# Patient Record
Sex: FEMALE | Race: WHITE | NOT HISPANIC OR LATINO | URBAN - METROPOLITAN AREA
[De-identification: names, ages, dates, MRNs, and addresses within clinical notes are randomized per-mention and may not be internally consistent; named-entity substitution may affect disease eponyms.]

---

## 2017-04-08 ENCOUNTER — INPATIENT (INPATIENT)
Facility: HOSPITAL | Age: 37
LOS: 24 days | Discharge: HOME | End: 2017-05-03
Attending: PSYCHIATRY & NEUROLOGY | Admitting: PSYCHIATRY & NEUROLOGY
Payer: MEDICAID

## 2017-04-08 VITALS
SYSTOLIC BLOOD PRESSURE: 158 MMHG | TEMPERATURE: 98 F | OXYGEN SATURATION: 100 % | RESPIRATION RATE: 16 BRPM | DIASTOLIC BLOOD PRESSURE: 90 MMHG | HEART RATE: 96 BPM

## 2017-04-08 DIAGNOSIS — F31.9 BIPOLAR DISORDER, UNSPECIFIED: ICD-10-CM

## 2017-04-08 DIAGNOSIS — F25.0 SCHIZOAFFECTIVE DISORDER, BIPOLAR TYPE: ICD-10-CM

## 2017-04-08 LAB
ALBUMIN SERPL ELPH-MCNC: 4.5 G/DL — SIGNIFICANT CHANGE UP (ref 3.3–5)
ALP SERPL-CCNC: 56 U/L — SIGNIFICANT CHANGE UP (ref 40–120)
ALT FLD-CCNC: 20 U/L — SIGNIFICANT CHANGE UP (ref 4–33)
AMPHET UR-MCNC: POSITIVE — SIGNIFICANT CHANGE UP
APAP SERPL-MCNC: < 15 UG/ML — LOW (ref 15–25)
APPEARANCE UR: SIGNIFICANT CHANGE UP
AST SERPL-CCNC: 20 U/L — SIGNIFICANT CHANGE UP (ref 4–32)
BACTERIA # UR AUTO: SIGNIFICANT CHANGE UP
BARBITURATES MEASUREMENT: NEGATIVE — SIGNIFICANT CHANGE UP
BARBITURATES UR SCN-MCNC: NEGATIVE — SIGNIFICANT CHANGE UP
BASOPHILS # BLD AUTO: 0.01 K/UL — SIGNIFICANT CHANGE UP (ref 0–0.2)
BASOPHILS NFR BLD AUTO: 0.1 % — SIGNIFICANT CHANGE UP (ref 0–2)
BENZODIAZ SERPL-MCNC: NEGATIVE — SIGNIFICANT CHANGE UP
BENZODIAZ UR-MCNC: NEGATIVE — SIGNIFICANT CHANGE UP
BILIRUB SERPL-MCNC: 0.3 MG/DL — SIGNIFICANT CHANGE UP (ref 0.2–1.2)
BILIRUB UR-MCNC: NEGATIVE — SIGNIFICANT CHANGE UP
BLOOD UR QL VISUAL: HIGH
BUN SERPL-MCNC: 14 MG/DL — SIGNIFICANT CHANGE UP (ref 7–23)
CALCIUM SERPL-MCNC: 9.3 MG/DL — SIGNIFICANT CHANGE UP (ref 8.4–10.5)
CANNABINOIDS UR-MCNC: NEGATIVE — SIGNIFICANT CHANGE UP
CHLORIDE SERPL-SCNC: 106 MMOL/L — SIGNIFICANT CHANGE UP (ref 98–107)
CO2 SERPL-SCNC: 21 MMOL/L — LOW (ref 22–31)
COCAINE METAB.OTHER UR-MCNC: NEGATIVE — SIGNIFICANT CHANGE UP
COLOR SPEC: YELLOW — SIGNIFICANT CHANGE UP
CREAT SERPL-MCNC: 0.8 MG/DL — SIGNIFICANT CHANGE UP (ref 0.5–1.3)
EOSINOPHIL # BLD AUTO: 0.07 K/UL — SIGNIFICANT CHANGE UP (ref 0–0.5)
EOSINOPHIL NFR BLD AUTO: 0.9 % — SIGNIFICANT CHANGE UP (ref 0–6)
ETHANOL BLD-MCNC: < 10 MG/DL — SIGNIFICANT CHANGE UP
GLUCOSE SERPL-MCNC: 82 MG/DL — SIGNIFICANT CHANGE UP (ref 70–99)
GLUCOSE UR-MCNC: NEGATIVE — SIGNIFICANT CHANGE UP
HCG UR-SCNC: NEGATIVE — SIGNIFICANT CHANGE UP
HCT VFR BLD CALC: 38.3 % — SIGNIFICANT CHANGE UP (ref 34.5–45)
HGB BLD-MCNC: 12.1 G/DL — SIGNIFICANT CHANGE UP (ref 11.5–15.5)
IMM GRANULOCYTES NFR BLD AUTO: 0.1 % — SIGNIFICANT CHANGE UP (ref 0–1.5)
KETONES UR-MCNC: NEGATIVE — SIGNIFICANT CHANGE UP
LEUKOCYTE ESTERASE UR-ACNC: HIGH
LYMPHOCYTES # BLD AUTO: 1.41 K/UL — SIGNIFICANT CHANGE UP (ref 1–3.3)
LYMPHOCYTES # BLD AUTO: 18.6 % — SIGNIFICANT CHANGE UP (ref 13–44)
MCHC RBC-ENTMCNC: 25.4 PG — LOW (ref 27–34)
MCHC RBC-ENTMCNC: 31.6 % — LOW (ref 32–36)
MCV RBC AUTO: 80.3 FL — SIGNIFICANT CHANGE UP (ref 80–100)
METHADONE UR-MCNC: NEGATIVE — SIGNIFICANT CHANGE UP
MONOCYTES # BLD AUTO: 0.6 K/UL — SIGNIFICANT CHANGE UP (ref 0–0.9)
MONOCYTES NFR BLD AUTO: 7.9 % — SIGNIFICANT CHANGE UP (ref 2–14)
MUCOUS THREADS # UR AUTO: SIGNIFICANT CHANGE UP
NEUTROPHILS # BLD AUTO: 5.47 K/UL — SIGNIFICANT CHANGE UP (ref 1.8–7.4)
NEUTROPHILS NFR BLD AUTO: 72.4 % — SIGNIFICANT CHANGE UP (ref 43–77)
NITRITE UR-MCNC: NEGATIVE — SIGNIFICANT CHANGE UP
OPIATES UR-MCNC: NEGATIVE — SIGNIFICANT CHANGE UP
OXYCODONE UR-MCNC: NEGATIVE — SIGNIFICANT CHANGE UP
PCP UR-MCNC: NEGATIVE — SIGNIFICANT CHANGE UP
PH UR: 7 — SIGNIFICANT CHANGE UP (ref 4.6–8)
PLATELET # BLD AUTO: 237 K/UL — SIGNIFICANT CHANGE UP (ref 150–400)
PMV BLD: 10.5 FL — SIGNIFICANT CHANGE UP (ref 7–13)
POTASSIUM SERPL-MCNC: 3.5 MMOL/L — SIGNIFICANT CHANGE UP (ref 3.5–5.3)
POTASSIUM SERPL-SCNC: 3.5 MMOL/L — SIGNIFICANT CHANGE UP (ref 3.5–5.3)
PROT SERPL-MCNC: 7.4 G/DL — SIGNIFICANT CHANGE UP (ref 6–8.3)
PROT UR-MCNC: 30 — HIGH
RBC # BLD: 4.77 M/UL — SIGNIFICANT CHANGE UP (ref 3.8–5.2)
RBC # FLD: 15.1 % — HIGH (ref 10.3–14.5)
RBC CASTS # UR COMP ASSIST: HIGH (ref 0–?)
SALICYLATES SERPL-MCNC: < 5 MG/DL — LOW (ref 15–30)
SODIUM SERPL-SCNC: 141 MMOL/L — SIGNIFICANT CHANGE UP (ref 135–145)
SP GR SPEC: 1.02 — SIGNIFICANT CHANGE UP (ref 1–1.03)
SP GR UR: 1.02 — SIGNIFICANT CHANGE UP (ref 1–1.03)
SQUAMOUS # UR AUTO: SIGNIFICANT CHANGE UP
TSH SERPL-MCNC: 1.88 UIU/ML — SIGNIFICANT CHANGE UP (ref 0.27–4.2)
UROBILINOGEN FLD QL: NORMAL E.U. — SIGNIFICANT CHANGE UP (ref 0.1–0.2)
WBC # BLD: 7.57 K/UL — SIGNIFICANT CHANGE UP (ref 3.8–10.5)
WBC # FLD AUTO: 7.57 K/UL — SIGNIFICANT CHANGE UP (ref 3.8–10.5)
WBC UR QL: HIGH (ref 0–?)
YEAST BUDDING # UR COMP ASSIST: SIGNIFICANT CHANGE UP

## 2017-04-08 PROCEDURE — 99285 EMERGENCY DEPT VISIT HI MDM: CPT

## 2017-04-08 RX ORDER — HALOPERIDOL DECANOATE 100 MG/ML
5 INJECTION INTRAMUSCULAR ONCE
Qty: 0 | Refills: 0 | Status: DISCONTINUED | OUTPATIENT
Start: 2017-04-08 | End: 2017-05-03

## 2017-04-08 RX ORDER — IBUPROFEN 200 MG
400 TABLET ORAL ONCE
Qty: 0 | Refills: 0 | Status: COMPLETED | OUTPATIENT
Start: 2017-04-08 | End: 2017-04-08

## 2017-04-08 RX ORDER — DIPHENHYDRAMINE HCL 50 MG
50 CAPSULE ORAL EVERY 6 HOURS
Qty: 0 | Refills: 0 | Status: DISCONTINUED | OUTPATIENT
Start: 2017-04-08 | End: 2017-05-03

## 2017-04-08 RX ORDER — TOPIRAMATE 25 MG
50 TABLET ORAL AT BEDTIME
Qty: 0 | Refills: 0 | Status: DISCONTINUED | OUTPATIENT
Start: 2017-04-08 | End: 2017-04-09

## 2017-04-08 RX ORDER — DIPHENHYDRAMINE HCL 50 MG
50 CAPSULE ORAL ONCE
Qty: 0 | Refills: 0 | Status: DISCONTINUED | OUTPATIENT
Start: 2017-04-08 | End: 2017-05-03

## 2017-04-08 RX ORDER — ACETAMINOPHEN 500 MG
650 TABLET ORAL ONCE
Qty: 0 | Refills: 0 | Status: COMPLETED | OUTPATIENT
Start: 2017-04-08 | End: 2017-04-08

## 2017-04-08 RX ORDER — INFLUENZA VIRUS VACCINE 15; 15; 15; 15 UG/.5ML; UG/.5ML; UG/.5ML; UG/.5ML
0.5 SUSPENSION INTRAMUSCULAR ONCE
Qty: 0 | Refills: 0 | Status: COMPLETED | OUTPATIENT
Start: 2017-04-08 | End: 2017-04-08

## 2017-04-08 RX ORDER — ARIPIPRAZOLE 15 MG/1
10 TABLET ORAL AT BEDTIME
Qty: 0 | Refills: 0 | Status: DISCONTINUED | OUTPATIENT
Start: 2017-04-08 | End: 2017-04-09

## 2017-04-08 RX ADMIN — Medication 50 MILLIGRAM(S): at 22:38

## 2017-04-08 RX ADMIN — Medication 2 MILLIGRAM(S): at 12:45

## 2017-04-08 RX ADMIN — Medication 650 MILLIGRAM(S): at 12:45

## 2017-04-08 RX ADMIN — Medication 400 MILLIGRAM(S): at 15:58

## 2017-04-08 RX ADMIN — Medication 400 MILLIGRAM(S): at 15:27

## 2017-04-08 RX ADMIN — Medication 400 MILLIGRAM(S): at 23:45

## 2017-04-08 RX ADMIN — ARIPIPRAZOLE 10 MILLIGRAM(S): 15 TABLET ORAL at 22:38

## 2017-04-08 RX ADMIN — Medication 400 MILLIGRAM(S): at 23:00

## 2017-04-08 NOTE — ED BEHAVIORAL HEALTH ASSESSMENT NOTE - HPI (INCLUDE ILLNESS QUALITY, SEVERITY, DURATION, TIMING, CONTEXT, MODIFYING FACTORS, ASSOCIATED SIGNS AND SYMPTOMS)
35 y/o single white female, lives at home with mother & 8 y/o daughter, disabled, mother is caretaker for 8 y/o, history of Schizoaffective Disorder, least 9 prior psychiatric hospitalizations for psychosis/mayur, discharged 4/5/17 from [facility] for psychosis, no suicide attempts/violence hx, 4 month old baby is in foster care, no drugs/ETOH/DTs, PMH denied, BIB EMS from a gas station after patient was behaving erratically in context of medication noncompliance.    In ED, patient arrives smelling strongly of gasoline. She reports she was "hanging out" at a gas station today with some "gas station friends". She denies purposely putting gasoline on herself. On interview, patient is disorganized, tangential & very childlike. She reports that her mother called 911 as her mother is mentally ill and needs a psychiatric evaluation. Patient states that she takes Abilify and Topamax for Bipolar Disorder, but is not able to state why she takes the medications. She refuses to answer questions as to what symptoms she has had in the past nor why she was recently psychiatrically hospitalized. She is guarded on interview & denies any psychiatric symptoms and requests discharge. She states she plans to go to a shelter as she wants to avoid her mother, though she admits she has never been to a shelter before.     Patient's mother reports that patient has a long history of Schizoaffective Disorder and at least 9 inpatient admissions. She states that patient recently gave birth to her daughter, who is now 4 months old. She states patient was not taking medication during pregnancy but was stable psychiatrically. She states patient continued to be stable until 2 weeks ago & was caring well for her new daughter appropriately. However, seemingly without any trigger, she reports patient became very paranoid, delusional, and stopped sleeping at night. She states patient began expressing belief that her baby was in danger & went missing with the baby. Law enforcement became involved & patient was psychiatrically hospitalized at [facility]. She states the baby was placed into the care of the paternal grandparents. She reports patient was d/c April 5th, but was not at baseline (baseline for includes no paranoia, no delusions, sleeping at night). She states since Wednesday, patient has been talking to herself, accusing family of tampering with different things that patient owns & behaving erratically. She states patient is not sleeping and paces all night. She reports that patient has been driving erratically and yesterday was seen 37 y/o single white female, lives at home with mother & 8 y/o daughter, disabled, mother is caretaker for 8 y/o, history of Schizoaffective Disorder, least 9 prior psychiatric hospitalizations for psychosis/mayur, discharged 4/5/17 from [facility] for psychosis, no suicide attempts/violence hx, 4 month old baby is in foster care, no drugs/ETOH/DTs, PMH denied, BIB EMS from a gas station after patient was behaving erratically in context of medication noncompliance.    In ED, patient arrives smelling strongly of gasoline. She reports she was "hanging out" at a gas station today with some "gas station friends". She denies purposely putting gasoline on herself. On interview, patient is disorganized, tangential & very childlike. She reports that her mother called 911 as her mother is mentally ill and needs a psychiatric evaluation. Patient states that she takes Abilify and Topamax for Bipolar Disorder, but is not able to state why she takes the medications. She refuses to answer questions as to what symptoms she has had in the past nor why she was recently psychiatrically hospitalized. She is guarded on interview & denies any psychiatric symptoms and requests discharge. She states she plans to go to a shelter as she wants to avoid her mother, though she admits she has never been to a shelter before.     Patient's mother reports that patient has a long history of Schizoaffective Disorder and at least 9 inpatient admissions. She states that patient recently gave birth to her daughter, who is now 4 months old. She states patient was not taking medication during pregnancy but was stable psychiatrically. She states patient continued to be stable until 2 weeks ago & was caring well for her new daughter appropriately. However, seemingly without any trigger, she reports patient became very paranoid, delusional, and stopped sleeping at night. She states patient began expressing belief that her baby was in danger & went missing with the baby. Law enforcement became involved & patient was psychiatrically hospitalized at [facility]. She states the baby was placed into the care of the paternal grandparents. She reports patient was d/c April 5th, but was not at baseline (baseline for includes no paranoia, no delusions, sleeping at night). She states since Wednesday, patient has been talking to herself, accusing family of tampering with different things that patient owns & behaving erratically. She states patient is not sleeping and paces all night. She reports patient is not at baseline & both she and pt's daughter do not feel safe with patient in the home given mood lability/paranoia. 37 y/o single white female, lives at home with mother & 10 y/o daughter, disabled, mother is caretaker for 10 y/o, history of Schizoaffective Disorder, least 9 prior psychiatric hospitalizations for psychosis/mayur, discharged 4/5/17 from [facility] for psychosis, no suicide attempts/violence hx, 4 month old baby is in foster care, no drugs/ETOH/DTs, PMH denied, BIB EMS from a gas station after patient was behaving erratically in context of medication noncompliance.    Pt's mother reports that patient has a long history of Schizoaffective Disorder and at least 9 inpatient admissions. She states that patient recently gave birth to her daughter, who is now 4 months old. She states patient was not taking medication during pregnancy but was stable psychiatrically. She states patient continued to be stable until 2 weeks ago & was caring well for her new daughter appropriately. However, seemingly without any trigger, she reports patient became very paranoid, delusional, and stopped sleeping at night. She states patient began expressing belief that her baby was in danger & went missing with the baby. Law enforcement became involved & patient was psychiatrically hospitalized at [place]. She states the baby was placed into the care of the paternal grandparents. She reports patient was d/c April 5th, but was not at baseline (baseline for includes no paranoia, no delusions, sleeping at night). She states since Wednesday, patient has been talking to herself, accusing family of tampering with different things that patient owns & behaving erratically. She states patient is not sleeping and paces all night. Patient's mother states she called 911 today after patient left the home in very agitated state. She states patient told her that all of her tires were flat & that the windshield needed repair, but pt's mother states that the car was not in that condition last night. She reports patient has been disorganized and it is concerning that she has been driving as yesterday patient stopped the car in the middle of the road & told a neighbor that the clutch wasn't working. However, pt's mother states that the car doesn't have a clutch & neighbor expressed concern that patient did not appear to be thinking clearly. She reports patient is not at baseline & both she and pt's daughter do not feel safe with patient in the home given mood lability/paranoia.    In ED, patient arrives smelling strongly of gasoline. She reports she was "hanging out" at a gas station today with some "gas station friends". She denies purposely putting gasoline on herself. On interview, patient is disorganized, tangential & very childlike. She reports that her mother called 911 as her mother is mentally ill and needs a psychiatric evaluation. Patient states that she takes Abilify and Topamax for Bipolar Disorder, but is not able to state why she takes the medications. She refuses to answer questions as to what symptoms she has had in the past nor why she was recently psychiatrically hospitalized. She is guarded on interview & denies any psychiatric symptoms and requests discharge. She states she plans to go to a shelter as she wants to avoid her mother, though she admits she has never been to a shelter before.

## 2017-04-08 NOTE — ED ADULT NURSE NOTE - CHIEF COMPLAINT QUOTE
pt [facility] in by [facility] pts mom called because pt not taking meds and acting irratic  pt calm but talkative at triage

## 2017-04-08 NOTE — ED BEHAVIORAL HEALTH NOTE - BEHAVIORAL HEALTH NOTE
Writer called [employer] to obtain payment authorization for [facility]. Writer was informed there are no clinicians available today. Writer's name and number were taken and writer was informed a clinician will call when available.

## 2017-04-08 NOTE — ED BEHAVIORAL HEALTH ASSESSMENT NOTE - PSYCHIATRIC ISSUES AND PLAN (INCLUDE STANDING AND PRN MEDICATION)
Restart Abilify 10mg q HS for psychosis & titrate up as tolerated; consider ORTIZ as pt's mother states patient has issues with compliance/remembering medications; Restart Topamax 50mg qd for weight control s/t antipsychotic use; Give Ativan 1mg BID for agitation. Provide Haldol 5mg q 6 hours PRN for agitation (IM or p.o.), Ativan 2mg q 6 hours PRN for agitation (IM or p.o.), Benadryl 50mg q 6 hours PRN for agitation (IM or p.o.) Restart Abilify 10mg q HS for psychosis & titrate up as tolerated; consider ORTIZ as pt's mother states patient has issues with compliance/remembering medications; Restart Topamax 50mg qd for weight control s/t antipsychotic use; Give Ativan 2mg BID for agitation. Provide Haldol 5mg q 6 hours PRN for agitation (IM or p.o.), Ativan 2mg q 6 hours PRN for agitation (IM or p.o.), Benadryl 50mg q 6 hours PRN for agitation (IM or p.o.)

## 2017-04-08 NOTE — ED ADULT TRIAGE NOTE - CHIEF COMPLAINT QUOTE
[facility] in [facility] pts mom called because pt not taking meds and acting irratic  pt calm but talkative at triage

## 2017-04-08 NOTE — ED BEHAVIORAL HEALTH ASSESSMENT NOTE - DETAILS
4 month old child is in foster care handoff to [facility] mother agrees with admit in care of pt's mother

## 2017-04-08 NOTE — ED BEHAVIORAL HEALTH ASSESSMENT NOTE - RISK ASSESSMENT
Risk factors include psychosis hx, mood disorder hx, recent d/c from psychiatric inpatient unit, impaired judgement/insight, paranoia, delusions. Patient is at high risk for harm and requires inpatient psychiatric stay.

## 2017-04-08 NOTE — ED PROVIDER NOTE - OBJECTIVE STATEMENT
36 year old female sent in by mother for not taking meds, acting erratic. Just d/c ed yesterday from [place], . Patient feels fine, states she's doing well on the meds. no hallucinations.

## 2017-04-08 NOTE — ED ADULT NURSE NOTE - OBJECTIVE STATEMENT
Pt received to . Pt recently discharged from [facility], from home, sent by mom - pt states, "I don't need to be here, I have no problems. I'm taking all my medicines." Denies SI/HI. Pt is agitated at times, yet calm at present. Awaiting psych/med eval.

## 2017-04-08 NOTE — ED BEHAVIORAL HEALTH ASSESSMENT NOTE - CASE SUMMARY
35 y/o single white female, lives at home with mother & 10 y/o daughter, disabled, mother is caretaker for 10 y/o, history of Schizoaffective Disorder, least 9 prior psychiatric hospitalizations for psychosis/mayur, discharged 4/5/17 from [facility] for psychosis, no suicide attempts/violence hx, 4 month old baby is in foster care, no drugs/ETOH/DTs, PMH denied, BIB EMS from a gas station after patient was behaving erratically in context of medication noncompliance. The patient is an imminent risk to self and would benefit from an inpatient psychiatric hospitalization for safety and stability.

## 2017-04-08 NOTE — ED BEHAVIORAL HEALTH ASSESSMENT NOTE - SUMMARY
37 y/o single white female, lives at home with mother & 10 y/o daughter, disabled, mother is caretaker for 10 y/o, history of Schizoaffective Disorder, least 9 prior psychiatric hospitalizations for psychosis/mayur, discharged 4/5/17 from [facility] for psychosis, no suicide attempts/violence hx, 4 month old baby is in foster care, no drugs/ETOH/DTs, PMH denied, BIB EMS from a gas station after patient was behaving erratically in context of medication noncompliance.  In ED, patient is guarded, paranoid and denying any psychiatric symptoms. She has impaired insight, judgment and is not able to engage in safety planning; requesting to go to a shelter. Pt's mother is stating patient has not been at her baseline, has been medication noncompliant, delusional and hallucinating. Patient was recently d/c from inpatient unit for psychosis. This patient is at risk for harm and will require inpatient psychiatric hospitalization for safety and stabilization.

## 2017-04-08 NOTE — ED BEHAVIORAL HEALTH ASSESSMENT NOTE - SUICIDE RISK FACTORS
Mood episode/Highly impulsive behavior/Access to means (pills, firearms, etc.)/Agitation/severe anxiety/Unable to engage in safety planning

## 2017-04-09 LAB
BACTERIA UR CULT: SIGNIFICANT CHANGE UP
SPECIMEN SOURCE: SIGNIFICANT CHANGE UP

## 2017-04-09 PROCEDURE — 99222 1ST HOSP IP/OBS MODERATE 55: CPT

## 2017-04-09 RX ORDER — ARIPIPRAZOLE 15 MG/1
10 TABLET ORAL ONCE
Qty: 0 | Refills: 0 | Status: COMPLETED | OUTPATIENT
Start: 2017-04-09 | End: 2017-04-09

## 2017-04-09 RX ORDER — TOPIRAMATE 25 MG
50 TABLET ORAL ONCE
Qty: 0 | Refills: 0 | Status: COMPLETED | OUTPATIENT
Start: 2017-04-09 | End: 2017-04-09

## 2017-04-09 RX ORDER — ARIPIPRAZOLE 15 MG/1
10 TABLET ORAL DAILY
Qty: 0 | Refills: 0 | Status: DISCONTINUED | OUTPATIENT
Start: 2017-04-09 | End: 2017-04-10

## 2017-04-09 RX ORDER — TOPIRAMATE 25 MG
50 TABLET ORAL DAILY
Qty: 0 | Refills: 0 | Status: DISCONTINUED | OUTPATIENT
Start: 2017-04-09 | End: 2017-04-12

## 2017-04-09 RX ORDER — IBUPROFEN 200 MG
600 TABLET ORAL EVERY 6 HOURS
Qty: 0 | Refills: 0 | Status: DISCONTINUED | OUTPATIENT
Start: 2017-04-09 | End: 2017-04-10

## 2017-04-09 RX ADMIN — Medication 600 MILLIGRAM(S): at 18:27

## 2017-04-09 RX ADMIN — Medication 50 MILLIGRAM(S): at 11:34

## 2017-04-09 RX ADMIN — ARIPIPRAZOLE 10 MILLIGRAM(S): 15 TABLET ORAL at 11:34

## 2017-04-09 RX ADMIN — Medication 2 MILLIGRAM(S): at 23:39

## 2017-04-09 RX ADMIN — Medication 600 MILLIGRAM(S): at 17:06

## 2017-04-09 NOTE — ED BEHAVIORAL HEALTH NOTE - BEHAVIORAL HEALTH NOTE
Writer called pt's insurance to obtain payment authorization, but was informed by Christine that no one is available to take clinical information.  Writer was informed someone will call when someone is available

## 2017-04-10 PROCEDURE — 99232 SBSQ HOSP IP/OBS MODERATE 35: CPT

## 2017-04-10 RX ORDER — ACETAMINOPHEN 500 MG
650 TABLET ORAL EVERY 6 HOURS
Qty: 0 | Refills: 0 | Status: DISCONTINUED | OUTPATIENT
Start: 2017-04-10 | End: 2017-05-03

## 2017-04-10 RX ORDER — ARIPIPRAZOLE 15 MG/1
15 TABLET ORAL DAILY
Qty: 0 | Refills: 0 | Status: DISCONTINUED | OUTPATIENT
Start: 2017-04-11 | End: 2017-04-11

## 2017-04-10 RX ORDER — DIVALPROEX SODIUM 500 MG/1
1000 TABLET, DELAYED RELEASE ORAL AT BEDTIME
Qty: 0 | Refills: 0 | Status: DISCONTINUED | OUTPATIENT
Start: 2017-04-10 | End: 2017-04-11

## 2017-04-10 RX ADMIN — Medication 650 MILLIGRAM(S): at 18:00

## 2017-04-10 RX ADMIN — Medication 600 MILLIGRAM(S): at 07:37

## 2017-04-10 RX ADMIN — Medication 650 MILLIGRAM(S): at 00:40

## 2017-04-10 RX ADMIN — Medication 650 MILLIGRAM(S): at 17:39

## 2017-04-10 RX ADMIN — Medication 650 MILLIGRAM(S): at 23:40

## 2017-04-10 RX ADMIN — Medication 50 MILLIGRAM(S): at 09:09

## 2017-04-10 RX ADMIN — ARIPIPRAZOLE 10 MILLIGRAM(S): 15 TABLET ORAL at 09:09

## 2017-04-10 RX ADMIN — Medication 600 MILLIGRAM(S): at 09:09

## 2017-04-11 LAB
BACTERIA UR CULT: SIGNIFICANT CHANGE UP
SPECIMEN SOURCE: SIGNIFICANT CHANGE UP

## 2017-04-11 PROCEDURE — 99232 SBSQ HOSP IP/OBS MODERATE 35: CPT

## 2017-04-11 RX ORDER — HALOPERIDOL DECANOATE 100 MG/ML
5 INJECTION INTRAMUSCULAR EVERY 6 HOURS
Qty: 0 | Refills: 0 | Status: DISCONTINUED | OUTPATIENT
Start: 2017-04-11 | End: 2017-05-03

## 2017-04-11 RX ORDER — QUETIAPINE FUMARATE 200 MG/1
50 TABLET, FILM COATED ORAL EVERY 6 HOURS
Qty: 0 | Refills: 0 | Status: DISCONTINUED | OUTPATIENT
Start: 2017-04-11 | End: 2017-05-03

## 2017-04-11 RX ORDER — ARIPIPRAZOLE 15 MG/1
20 TABLET ORAL DAILY
Qty: 0 | Refills: 0 | Status: DISCONTINUED | OUTPATIENT
Start: 2017-04-12 | End: 2017-04-12

## 2017-04-11 RX ADMIN — HALOPERIDOL DECANOATE 5 MILLIGRAM(S): 100 INJECTION INTRAMUSCULAR at 12:52

## 2017-04-11 RX ADMIN — Medication 50 MILLIGRAM(S): at 12:53

## 2017-04-11 RX ADMIN — ARIPIPRAZOLE 15 MILLIGRAM(S): 15 TABLET ORAL at 08:05

## 2017-04-11 RX ADMIN — Medication 650 MILLIGRAM(S): at 20:45

## 2017-04-11 RX ADMIN — Medication 650 MILLIGRAM(S): at 21:45

## 2017-04-11 RX ADMIN — Medication 650 MILLIGRAM(S): at 05:40

## 2017-04-11 RX ADMIN — Medication 2 MILLIGRAM(S): at 11:23

## 2017-04-11 RX ADMIN — Medication 50 MILLIGRAM(S): at 08:05

## 2017-04-11 RX ADMIN — Medication 650 MILLIGRAM(S): at 06:42

## 2017-04-12 PROCEDURE — 99232 SBSQ HOSP IP/OBS MODERATE 35: CPT

## 2017-04-12 RX ORDER — TOPIRAMATE 25 MG
75 TABLET ORAL DAILY
Qty: 0 | Refills: 0 | Status: DISCONTINUED | OUTPATIENT
Start: 2017-04-12 | End: 2017-05-03

## 2017-04-12 RX ORDER — ARIPIPRAZOLE 15 MG/1
25 TABLET ORAL DAILY
Qty: 0 | Refills: 0 | Status: DISCONTINUED | OUTPATIENT
Start: 2017-04-13 | End: 2017-04-13

## 2017-04-12 RX ADMIN — Medication 650 MILLIGRAM(S): at 21:31

## 2017-04-12 RX ADMIN — Medication 650 MILLIGRAM(S): at 12:08

## 2017-04-12 RX ADMIN — Medication 650 MILLIGRAM(S): at 05:40

## 2017-04-12 RX ADMIN — Medication 2 MILLIGRAM(S): at 23:08

## 2017-04-12 RX ADMIN — Medication 50 MILLIGRAM(S): at 08:45

## 2017-04-12 RX ADMIN — ARIPIPRAZOLE 20 MILLIGRAM(S): 15 TABLET ORAL at 08:45

## 2017-04-12 RX ADMIN — Medication 650 MILLIGRAM(S): at 21:00

## 2017-04-13 PROCEDURE — 99232 SBSQ HOSP IP/OBS MODERATE 35: CPT | Mod: 25

## 2017-04-13 PROCEDURE — 90853 GROUP PSYCHOTHERAPY: CPT

## 2017-04-13 RX ORDER — ARIPIPRAZOLE 15 MG/1
30 TABLET ORAL DAILY
Qty: 0 | Refills: 0 | Status: DISCONTINUED | OUTPATIENT
Start: 2017-04-14 | End: 2017-04-26

## 2017-04-13 RX ORDER — IBUPROFEN 200 MG
400 TABLET ORAL ONCE
Qty: 0 | Refills: 0 | Status: COMPLETED | OUTPATIENT
Start: 2017-04-13 | End: 2017-04-22

## 2017-04-13 RX ADMIN — Medication 75 MILLIGRAM(S): at 08:46

## 2017-04-13 RX ADMIN — ARIPIPRAZOLE 25 MILLIGRAM(S): 15 TABLET ORAL at 08:46

## 2017-04-13 RX ADMIN — QUETIAPINE FUMARATE 50 MILLIGRAM(S): 200 TABLET, FILM COATED ORAL at 21:45

## 2017-04-13 RX ADMIN — Medication 2 MILLIGRAM(S): at 21:45

## 2017-04-14 PROCEDURE — 99222 1ST HOSP IP/OBS MODERATE 55: CPT

## 2017-04-14 RX ORDER — QUETIAPINE FUMARATE 200 MG/1
50 TABLET, FILM COATED ORAL AT BEDTIME
Qty: 0 | Refills: 0 | Status: DISCONTINUED | OUTPATIENT
Start: 2017-04-14 | End: 2017-04-17

## 2017-04-14 RX ADMIN — Medication 650 MILLIGRAM(S): at 07:00

## 2017-04-14 RX ADMIN — QUETIAPINE FUMARATE 50 MILLIGRAM(S): 200 TABLET, FILM COATED ORAL at 20:28

## 2017-04-14 RX ADMIN — Medication 50 MILLIGRAM(S): at 23:45

## 2017-04-14 RX ADMIN — Medication 75 MILLIGRAM(S): at 08:05

## 2017-04-14 RX ADMIN — Medication 650 MILLIGRAM(S): at 15:30

## 2017-04-14 RX ADMIN — Medication 650 MILLIGRAM(S): at 06:15

## 2017-04-14 RX ADMIN — Medication 650 MILLIGRAM(S): at 16:16

## 2017-04-14 RX ADMIN — ARIPIPRAZOLE 30 MILLIGRAM(S): 15 TABLET ORAL at 08:03

## 2017-04-15 RX ADMIN — QUETIAPINE FUMARATE 50 MILLIGRAM(S): 200 TABLET, FILM COATED ORAL at 20:37

## 2017-04-15 RX ADMIN — Medication 650 MILLIGRAM(S): at 05:15

## 2017-04-15 RX ADMIN — Medication 650 MILLIGRAM(S): at 06:15

## 2017-04-15 RX ADMIN — ARIPIPRAZOLE 30 MILLIGRAM(S): 15 TABLET ORAL at 08:06

## 2017-04-15 RX ADMIN — Medication 75 MILLIGRAM(S): at 08:06

## 2017-04-16 RX ADMIN — Medication 75 MILLIGRAM(S): at 08:01

## 2017-04-16 RX ADMIN — Medication 650 MILLIGRAM(S): at 06:10

## 2017-04-16 RX ADMIN — Medication 50 MILLIGRAM(S): at 02:44

## 2017-04-16 RX ADMIN — ARIPIPRAZOLE 30 MILLIGRAM(S): 15 TABLET ORAL at 08:01

## 2017-04-16 RX ADMIN — Medication 50 MILLIGRAM(S): at 11:25

## 2017-04-17 PROCEDURE — 99232 SBSQ HOSP IP/OBS MODERATE 35: CPT

## 2017-04-17 RX ORDER — DIVALPROEX SODIUM 500 MG/1
1000 TABLET, DELAYED RELEASE ORAL AT BEDTIME
Qty: 0 | Refills: 0 | Status: DISCONTINUED | OUTPATIENT
Start: 2017-04-17 | End: 2017-04-21

## 2017-04-17 RX ADMIN — QUETIAPINE FUMARATE 50 MILLIGRAM(S): 200 TABLET, FILM COATED ORAL at 22:01

## 2017-04-17 RX ADMIN — Medication 650 MILLIGRAM(S): at 13:12

## 2017-04-17 RX ADMIN — ARIPIPRAZOLE 30 MILLIGRAM(S): 15 TABLET ORAL at 08:04

## 2017-04-17 RX ADMIN — Medication 75 MILLIGRAM(S): at 08:04

## 2017-04-17 RX ADMIN — QUETIAPINE FUMARATE 50 MILLIGRAM(S): 200 TABLET, FILM COATED ORAL at 00:21

## 2017-04-17 RX ADMIN — Medication 2 MILLIGRAM(S): at 00:21

## 2017-04-17 RX ADMIN — DIVALPROEX SODIUM 1000 MILLIGRAM(S): 500 TABLET, DELAYED RELEASE ORAL at 22:01

## 2017-04-17 RX ADMIN — Medication 650 MILLIGRAM(S): at 13:42

## 2017-04-18 PROCEDURE — 99232 SBSQ HOSP IP/OBS MODERATE 35: CPT

## 2017-04-18 RX ADMIN — Medication 2 MILLIGRAM(S): at 02:35

## 2017-04-18 RX ADMIN — DIVALPROEX SODIUM 1000 MILLIGRAM(S): 500 TABLET, DELAYED RELEASE ORAL at 20:24

## 2017-04-18 RX ADMIN — Medication 650 MILLIGRAM(S): at 23:56

## 2017-04-18 RX ADMIN — QUETIAPINE FUMARATE 50 MILLIGRAM(S): 200 TABLET, FILM COATED ORAL at 21:17

## 2017-04-18 RX ADMIN — ARIPIPRAZOLE 30 MILLIGRAM(S): 15 TABLET ORAL at 08:28

## 2017-04-18 RX ADMIN — Medication 75 MILLIGRAM(S): at 08:28

## 2017-04-19 PROCEDURE — 99232 SBSQ HOSP IP/OBS MODERATE 35: CPT

## 2017-04-19 RX ADMIN — DIVALPROEX SODIUM 1000 MILLIGRAM(S): 500 TABLET, DELAYED RELEASE ORAL at 20:10

## 2017-04-19 RX ADMIN — Medication 650 MILLIGRAM(S): at 00:50

## 2017-04-19 RX ADMIN — Medication 75 MILLIGRAM(S): at 08:46

## 2017-04-19 RX ADMIN — Medication 650 MILLIGRAM(S): at 05:56

## 2017-04-19 RX ADMIN — Medication 2 MILLIGRAM(S): at 20:06

## 2017-04-19 RX ADMIN — QUETIAPINE FUMARATE 50 MILLIGRAM(S): 200 TABLET, FILM COATED ORAL at 23:35

## 2017-04-19 RX ADMIN — ARIPIPRAZOLE 30 MILLIGRAM(S): 15 TABLET ORAL at 08:46

## 2017-04-19 RX ADMIN — Medication 650 MILLIGRAM(S): at 10:51

## 2017-04-19 RX ADMIN — Medication 650 MILLIGRAM(S): at 12:00

## 2017-04-19 RX ADMIN — Medication 650 MILLIGRAM(S): at 06:46

## 2017-04-20 PROCEDURE — 99232 SBSQ HOSP IP/OBS MODERATE 35: CPT

## 2017-04-20 RX ORDER — IBUPROFEN 200 MG
400 TABLET ORAL EVERY 6 HOURS
Qty: 0 | Refills: 0 | Status: DISCONTINUED | OUTPATIENT
Start: 2017-04-20 | End: 2017-05-03

## 2017-04-20 RX ADMIN — ARIPIPRAZOLE 30 MILLIGRAM(S): 15 TABLET ORAL at 08:56

## 2017-04-20 RX ADMIN — DIVALPROEX SODIUM 1000 MILLIGRAM(S): 500 TABLET, DELAYED RELEASE ORAL at 20:42

## 2017-04-20 RX ADMIN — Medication 650 MILLIGRAM(S): at 00:46

## 2017-04-20 RX ADMIN — Medication 650 MILLIGRAM(S): at 09:06

## 2017-04-20 RX ADMIN — Medication 75 MILLIGRAM(S): at 08:56

## 2017-04-20 RX ADMIN — Medication 400 MILLIGRAM(S): at 20:42

## 2017-04-20 RX ADMIN — Medication 400 MILLIGRAM(S): at 21:53

## 2017-04-20 RX ADMIN — Medication 650 MILLIGRAM(S): at 10:00

## 2017-04-20 RX ADMIN — Medication 400 MILLIGRAM(S): at 14:30

## 2017-04-20 RX ADMIN — Medication 400 MILLIGRAM(S): at 16:00

## 2017-04-20 RX ADMIN — Medication 650 MILLIGRAM(S): at 01:55

## 2017-04-21 LAB
CHOLEST SERPL-MCNC: 141 MG/DL — SIGNIFICANT CHANGE UP (ref 120–199)
HBA1C BLD-MCNC: 5.6 % — SIGNIFICANT CHANGE UP (ref 4–5.6)
HDLC SERPL-MCNC: 45 MG/DL — SIGNIFICANT CHANGE UP (ref 45–65)
LIPID PNL WITH DIRECT LDL SERPL: 86 MG/DL — SIGNIFICANT CHANGE UP
TRIGL SERPL-MCNC: 50 MG/DL — SIGNIFICANT CHANGE UP (ref 10–149)
VALPROATE SERPL-MCNC: 55.1 UG/ML — SIGNIFICANT CHANGE UP (ref 50–100)

## 2017-04-21 PROCEDURE — 99232 SBSQ HOSP IP/OBS MODERATE 35: CPT

## 2017-04-21 RX ORDER — DIVALPROEX SODIUM 500 MG/1
1500 TABLET, DELAYED RELEASE ORAL AT BEDTIME
Qty: 0 | Refills: 0 | Status: DISCONTINUED | OUTPATIENT
Start: 2017-04-21 | End: 2017-05-03

## 2017-04-21 RX ORDER — DIVALPROEX SODIUM 500 MG/1
1250 TABLET, DELAYED RELEASE ORAL AT BEDTIME
Qty: 0 | Refills: 0 | Status: DISCONTINUED | OUTPATIENT
Start: 2017-04-21 | End: 2017-04-21

## 2017-04-21 RX ADMIN — Medication 75 MILLIGRAM(S): at 09:00

## 2017-04-21 RX ADMIN — Medication 400 MILLIGRAM(S): at 09:23

## 2017-04-21 RX ADMIN — DIVALPROEX SODIUM 1500 MILLIGRAM(S): 500 TABLET, DELAYED RELEASE ORAL at 20:11

## 2017-04-21 RX ADMIN — Medication 650 MILLIGRAM(S): at 20:26

## 2017-04-21 RX ADMIN — Medication 650 MILLIGRAM(S): at 20:11

## 2017-04-21 RX ADMIN — QUETIAPINE FUMARATE 50 MILLIGRAM(S): 200 TABLET, FILM COATED ORAL at 03:33

## 2017-04-21 RX ADMIN — Medication 400 MILLIGRAM(S): at 12:58

## 2017-04-21 RX ADMIN — Medication 400 MILLIGRAM(S): at 16:35

## 2017-04-21 RX ADMIN — ARIPIPRAZOLE 30 MILLIGRAM(S): 15 TABLET ORAL at 09:00

## 2017-04-21 RX ADMIN — Medication 400 MILLIGRAM(S): at 16:09

## 2017-04-22 RX ADMIN — DIVALPROEX SODIUM 1500 MILLIGRAM(S): 500 TABLET, DELAYED RELEASE ORAL at 20:42

## 2017-04-22 RX ADMIN — QUETIAPINE FUMARATE 50 MILLIGRAM(S): 200 TABLET, FILM COATED ORAL at 00:52

## 2017-04-22 RX ADMIN — Medication 650 MILLIGRAM(S): at 06:45

## 2017-04-22 RX ADMIN — Medication 650 MILLIGRAM(S): at 13:19

## 2017-04-22 RX ADMIN — Medication 400 MILLIGRAM(S): at 16:00

## 2017-04-22 RX ADMIN — Medication 650 MILLIGRAM(S): at 05:45

## 2017-04-22 RX ADMIN — Medication 400 MILLIGRAM(S): at 01:59

## 2017-04-22 RX ADMIN — Medication 400 MILLIGRAM(S): at 04:19

## 2017-04-22 RX ADMIN — Medication 2 MILLIGRAM(S): at 08:05

## 2017-04-22 RX ADMIN — Medication 400 MILLIGRAM(S): at 09:00

## 2017-04-22 RX ADMIN — Medication 400 MILLIGRAM(S): at 22:00

## 2017-04-22 RX ADMIN — ARIPIPRAZOLE 30 MILLIGRAM(S): 15 TABLET ORAL at 08:14

## 2017-04-22 RX ADMIN — Medication 75 MILLIGRAM(S): at 08:14

## 2017-04-22 RX ADMIN — Medication 650 MILLIGRAM(S): at 12:15

## 2017-04-22 RX ADMIN — Medication 400 MILLIGRAM(S): at 22:20

## 2017-04-22 RX ADMIN — Medication 400 MILLIGRAM(S): at 08:05

## 2017-04-22 RX ADMIN — Medication 400 MILLIGRAM(S): at 15:30

## 2017-04-23 RX ADMIN — Medication 650 MILLIGRAM(S): at 20:38

## 2017-04-23 RX ADMIN — Medication 650 MILLIGRAM(S): at 09:23

## 2017-04-23 RX ADMIN — Medication 400 MILLIGRAM(S): at 17:23

## 2017-04-23 RX ADMIN — DIVALPROEX SODIUM 1500 MILLIGRAM(S): 500 TABLET, DELAYED RELEASE ORAL at 20:35

## 2017-04-23 RX ADMIN — Medication 400 MILLIGRAM(S): at 15:06

## 2017-04-23 RX ADMIN — Medication 650 MILLIGRAM(S): at 02:19

## 2017-04-23 RX ADMIN — Medication 650 MILLIGRAM(S): at 21:38

## 2017-04-23 RX ADMIN — ARIPIPRAZOLE 30 MILLIGRAM(S): 15 TABLET ORAL at 08:59

## 2017-04-23 RX ADMIN — Medication 400 MILLIGRAM(S): at 06:00

## 2017-04-23 RX ADMIN — Medication 650 MILLIGRAM(S): at 03:47

## 2017-04-23 RX ADMIN — Medication 75 MILLIGRAM(S): at 08:59

## 2017-04-23 RX ADMIN — Medication 400 MILLIGRAM(S): at 05:00

## 2017-04-23 RX ADMIN — Medication 650 MILLIGRAM(S): at 09:00

## 2017-04-24 PROCEDURE — 99232 SBSQ HOSP IP/OBS MODERATE 35: CPT

## 2017-04-24 RX ADMIN — Medication 650 MILLIGRAM(S): at 11:53

## 2017-04-24 RX ADMIN — Medication 400 MILLIGRAM(S): at 23:13

## 2017-04-24 RX ADMIN — Medication 400 MILLIGRAM(S): at 14:35

## 2017-04-24 RX ADMIN — Medication 400 MILLIGRAM(S): at 08:33

## 2017-04-24 RX ADMIN — QUETIAPINE FUMARATE 50 MILLIGRAM(S): 200 TABLET, FILM COATED ORAL at 02:37

## 2017-04-24 RX ADMIN — Medication 400 MILLIGRAM(S): at 09:30

## 2017-04-24 RX ADMIN — Medication 75 MILLIGRAM(S): at 08:33

## 2017-04-24 RX ADMIN — Medication 650 MILLIGRAM(S): at 13:02

## 2017-04-24 RX ADMIN — Medication 400 MILLIGRAM(S): at 15:35

## 2017-04-24 RX ADMIN — DIVALPROEX SODIUM 1500 MILLIGRAM(S): 500 TABLET, DELAYED RELEASE ORAL at 20:48

## 2017-04-24 RX ADMIN — ARIPIPRAZOLE 30 MILLIGRAM(S): 15 TABLET ORAL at 08:33

## 2017-04-25 PROCEDURE — 99232 SBSQ HOSP IP/OBS MODERATE 35: CPT

## 2017-04-25 RX ADMIN — QUETIAPINE FUMARATE 50 MILLIGRAM(S): 200 TABLET, FILM COATED ORAL at 03:44

## 2017-04-25 RX ADMIN — Medication 400 MILLIGRAM(S): at 00:13

## 2017-04-25 RX ADMIN — Medication 400 MILLIGRAM(S): at 13:57

## 2017-04-25 RX ADMIN — Medication 650 MILLIGRAM(S): at 19:51

## 2017-04-25 RX ADMIN — ARIPIPRAZOLE 30 MILLIGRAM(S): 15 TABLET ORAL at 08:50

## 2017-04-25 RX ADMIN — DIVALPROEX SODIUM 1500 MILLIGRAM(S): 500 TABLET, DELAYED RELEASE ORAL at 20:04

## 2017-04-25 RX ADMIN — QUETIAPINE FUMARATE 50 MILLIGRAM(S): 200 TABLET, FILM COATED ORAL at 20:05

## 2017-04-25 RX ADMIN — Medication 650 MILLIGRAM(S): at 20:40

## 2017-04-25 RX ADMIN — Medication 75 MILLIGRAM(S): at 08:50

## 2017-04-25 RX ADMIN — Medication 400 MILLIGRAM(S): at 14:20

## 2017-04-26 LAB — VALPROATE SERPL-MCNC: 102.9 UG/ML — HIGH (ref 50–100)

## 2017-04-26 PROCEDURE — 99232 SBSQ HOSP IP/OBS MODERATE 35: CPT

## 2017-04-26 RX ORDER — RISPERIDONE 4 MG/1
1 TABLET ORAL AT BEDTIME
Qty: 0 | Refills: 0 | Status: DISCONTINUED | OUTPATIENT
Start: 2017-04-26 | End: 2017-04-27

## 2017-04-26 RX ORDER — ARIPIPRAZOLE 15 MG/1
25 TABLET ORAL DAILY
Qty: 0 | Refills: 0 | Status: DISCONTINUED | OUTPATIENT
Start: 2017-04-26 | End: 2017-04-27

## 2017-04-26 RX ADMIN — ARIPIPRAZOLE 30 MILLIGRAM(S): 15 TABLET ORAL at 08:07

## 2017-04-26 RX ADMIN — Medication 75 MILLIGRAM(S): at 08:07

## 2017-04-26 RX ADMIN — DIVALPROEX SODIUM 1500 MILLIGRAM(S): 500 TABLET, DELAYED RELEASE ORAL at 21:12

## 2017-04-26 RX ADMIN — RISPERIDONE 1 MILLIGRAM(S): 4 TABLET ORAL at 21:12

## 2017-04-27 PROCEDURE — 99232 SBSQ HOSP IP/OBS MODERATE 35: CPT

## 2017-04-27 RX ORDER — ARIPIPRAZOLE 15 MG/1
20 TABLET ORAL DAILY
Qty: 0 | Refills: 0 | Status: DISCONTINUED | OUTPATIENT
Start: 2017-04-28 | End: 2017-05-01

## 2017-04-27 RX ORDER — RISPERIDONE 4 MG/1
2 TABLET ORAL AT BEDTIME
Qty: 0 | Refills: 0 | Status: DISCONTINUED | OUTPATIENT
Start: 2017-04-27 | End: 2017-05-01

## 2017-04-27 RX ADMIN — DIVALPROEX SODIUM 1500 MILLIGRAM(S): 500 TABLET, DELAYED RELEASE ORAL at 20:53

## 2017-04-27 RX ADMIN — Medication 400 MILLIGRAM(S): at 06:30

## 2017-04-27 RX ADMIN — Medication 400 MILLIGRAM(S): at 20:55

## 2017-04-27 RX ADMIN — Medication 400 MILLIGRAM(S): at 07:00

## 2017-04-27 RX ADMIN — Medication 400 MILLIGRAM(S): at 21:55

## 2017-04-27 RX ADMIN — ARIPIPRAZOLE 25 MILLIGRAM(S): 15 TABLET ORAL at 09:08

## 2017-04-27 RX ADMIN — RISPERIDONE 2 MILLIGRAM(S): 4 TABLET ORAL at 20:53

## 2017-04-27 RX ADMIN — Medication 75 MILLIGRAM(S): at 09:08

## 2017-04-28 PROCEDURE — 99232 SBSQ HOSP IP/OBS MODERATE 35: CPT

## 2017-04-28 RX ADMIN — Medication 400 MILLIGRAM(S): at 20:42

## 2017-04-28 RX ADMIN — Medication 400 MILLIGRAM(S): at 22:10

## 2017-04-28 RX ADMIN — Medication 400 MILLIGRAM(S): at 09:02

## 2017-04-28 RX ADMIN — Medication 400 MILLIGRAM(S): at 08:43

## 2017-04-28 RX ADMIN — RISPERIDONE 2 MILLIGRAM(S): 4 TABLET ORAL at 20:42

## 2017-04-28 RX ADMIN — DIVALPROEX SODIUM 1500 MILLIGRAM(S): 500 TABLET, DELAYED RELEASE ORAL at 20:42

## 2017-04-28 RX ADMIN — Medication 75 MILLIGRAM(S): at 08:09

## 2017-04-28 RX ADMIN — ARIPIPRAZOLE 20 MILLIGRAM(S): 15 TABLET ORAL at 08:09

## 2017-04-29 RX ADMIN — Medication 400 MILLIGRAM(S): at 09:23

## 2017-04-29 RX ADMIN — DIVALPROEX SODIUM 1500 MILLIGRAM(S): 500 TABLET, DELAYED RELEASE ORAL at 21:08

## 2017-04-29 RX ADMIN — Medication 400 MILLIGRAM(S): at 09:00

## 2017-04-29 RX ADMIN — Medication 400 MILLIGRAM(S): at 21:00

## 2017-04-29 RX ADMIN — Medication 75 MILLIGRAM(S): at 08:53

## 2017-04-29 RX ADMIN — ARIPIPRAZOLE 20 MILLIGRAM(S): 15 TABLET ORAL at 08:53

## 2017-04-29 RX ADMIN — Medication 400 MILLIGRAM(S): at 22:08

## 2017-04-29 RX ADMIN — RISPERIDONE 2 MILLIGRAM(S): 4 TABLET ORAL at 21:08

## 2017-04-30 RX ADMIN — Medication 400 MILLIGRAM(S): at 12:41

## 2017-04-30 RX ADMIN — ARIPIPRAZOLE 20 MILLIGRAM(S): 15 TABLET ORAL at 08:31

## 2017-04-30 RX ADMIN — RISPERIDONE 2 MILLIGRAM(S): 4 TABLET ORAL at 20:36

## 2017-04-30 RX ADMIN — Medication 75 MILLIGRAM(S): at 08:31

## 2017-04-30 RX ADMIN — DIVALPROEX SODIUM 1500 MILLIGRAM(S): 500 TABLET, DELAYED RELEASE ORAL at 20:36

## 2017-05-01 ENCOUNTER — EMERGENCY (EMERGENCY)
Facility: HOSPITAL | Age: 37
LOS: 1 days | Discharge: HOME | End: 2017-05-01
Admitting: EMERGENCY MEDICINE
Payer: MEDICAID

## 2017-05-01 VITALS
RESPIRATION RATE: 18 BRPM | DIASTOLIC BLOOD PRESSURE: 74 MMHG | HEART RATE: 92 BPM | TEMPERATURE: 98 F | SYSTOLIC BLOOD PRESSURE: 134 MMHG | OXYGEN SATURATION: 100 %

## 2017-05-01 PROBLEM — F31.9 BIPOLAR DISORDER, UNSPECIFIED: Chronic | Status: ACTIVE | Noted: 2017-04-08

## 2017-05-01 PROCEDURE — 99283 EMERGENCY DEPT VISIT LOW MDM: CPT

## 2017-05-01 PROCEDURE — 99232 SBSQ HOSP IP/OBS MODERATE 35: CPT

## 2017-05-01 RX ORDER — RISPERIDONE 4 MG/1
3 TABLET ORAL AT BEDTIME
Qty: 0 | Refills: 0 | Status: DISCONTINUED | OUTPATIENT
Start: 2017-05-01 | End: 2017-05-02

## 2017-05-01 RX ORDER — EPINEPHRINE 0.3 MG/.3ML
0.3 INJECTION INTRAMUSCULAR; SUBCUTANEOUS ONCE
Qty: 0 | Refills: 0 | Status: COMPLETED | OUTPATIENT
Start: 2017-05-01 | End: 2017-05-01

## 2017-05-01 RX ORDER — ARIPIPRAZOLE 15 MG/1
15 TABLET ORAL DAILY
Qty: 0 | Refills: 0 | Status: DISCONTINUED | OUTPATIENT
Start: 2017-05-02 | End: 2017-05-02

## 2017-05-01 RX ADMIN — ARIPIPRAZOLE 20 MILLIGRAM(S): 15 TABLET ORAL at 08:23

## 2017-05-01 RX ADMIN — RISPERIDONE 3 MILLIGRAM(S): 4 TABLET ORAL at 20:57

## 2017-05-01 RX ADMIN — DIVALPROEX SODIUM 1500 MILLIGRAM(S): 500 TABLET, DELAYED RELEASE ORAL at 20:57

## 2017-05-01 RX ADMIN — Medication 400 MILLIGRAM(S): at 08:51

## 2017-05-01 RX ADMIN — Medication 400 MILLIGRAM(S): at 08:23

## 2017-05-01 RX ADMIN — Medication 75 MILLIGRAM(S): at 08:23

## 2017-05-01 NOTE — ED PROVIDER NOTE - OBJECTIVE STATEMENT
36 y/o female pmh bipolar sent from [facility] for allergic reaction to tuna fish x today. Pt admits to eating tuna for lunch "because it was so good", even though she knows that she is allergic. Pt admits to having generalized itching and "face swelling" after eating tuna and was given 50mg Benadryl but refused epi. Pt now denies any complaints. Admits to feeling much better. Denies throat itching, difficulty breathing or swallowing, wheezing, abd pain ,n/v/d, sob, fever or chills.

## 2017-05-01 NOTE — ED ADULT NURSE REASSESSMENT NOTE - NS ED NURSE REASSESS COMMENT FT1
Pt received to intake hallway BIBEMS aaox3 from  psych accompanied by  staff member David, Pt is ambulatory appears in NAD denies difficulty breathing, throat swelling. p/w no apparent swelling to face jaw neck, Pt speaks calm and clearly, no hives or rash noted.  Per EMS:  warns that PT is flight risk though EMS denies PT exhibited any evidence of being a flight risk during transport.   Intake staff made aware.  Will monitor.

## 2017-05-01 NOTE — ED PROVIDER NOTE - MEDICAL DECISION MAKING DETAILS
38 y/o female w/ allergic reaction to tuna fish- now asymptomatic after PO benadryl at [facility]. PE wnl, no lip, tongue or facial swelling, no wheezing or stridor, no urticaria. stable for dc.

## 2017-05-01 NOTE — ED CLERICAL - NS ED CLERK NOTE PRE-ARRIVAL INFORMATION; ADDITIONAL PRE-ARRIVAL INFORMATION
Generalized body itching including throat after eating tuna. Pt refused epi pen. Treated with Benadryl. Hx shchizoaffective disorder,mayur

## 2017-05-01 NOTE — ED ADULT TRIAGE NOTE - CHIEF COMPLAINT QUOTE
pt BIBA from [facility], as per EMS, pt ate tuna and had allergic reaction (itching).  pt received benadryl 50mg at 1325 and refused epi.  pt appears in NAD

## 2017-05-02 PROCEDURE — 99232 SBSQ HOSP IP/OBS MODERATE 35: CPT

## 2017-05-02 RX ORDER — RISPERIDONE 4 MG/1
4 TABLET ORAL AT BEDTIME
Qty: 0 | Refills: 0 | Status: DISCONTINUED | OUTPATIENT
Start: 2017-05-02 | End: 2017-05-02

## 2017-05-02 RX ORDER — TOPIRAMATE 25 MG
3 TABLET ORAL
Qty: 45 | Refills: 0 | OUTPATIENT
Start: 2017-05-02 | End: 2017-05-17

## 2017-05-02 RX ORDER — TOPIRAMATE 25 MG
1 TABLET ORAL
Qty: 0 | Refills: 0 | COMMUNITY

## 2017-05-02 RX ORDER — RISPERIDONE 4 MG/1
4 TABLET ORAL AT BEDTIME
Qty: 0 | Refills: 0 | Status: DISCONTINUED | OUTPATIENT
Start: 2017-05-02 | End: 2017-05-03

## 2017-05-02 RX ORDER — ARIPIPRAZOLE 15 MG/1
10 TABLET ORAL DAILY
Qty: 0 | Refills: 0 | Status: DISCONTINUED | OUTPATIENT
Start: 2017-05-03 | End: 2017-05-03

## 2017-05-02 RX ORDER — RISPERIDONE 4 MG/1
1 TABLET ORAL
Qty: 15 | Refills: 0 | OUTPATIENT
Start: 2017-05-02 | End: 2017-05-17

## 2017-05-02 RX ORDER — ARIPIPRAZOLE 15 MG/1
1 TABLET ORAL
Qty: 0 | Refills: 0 | COMMUNITY

## 2017-05-02 RX ORDER — DIVALPROEX SODIUM 500 MG/1
3 TABLET, DELAYED RELEASE ORAL
Qty: 45 | Refills: 0 | OUTPATIENT
Start: 2017-05-02 | End: 2017-05-17

## 2017-05-02 RX ADMIN — Medication 400 MILLIGRAM(S): at 18:30

## 2017-05-02 RX ADMIN — Medication 400 MILLIGRAM(S): at 09:17

## 2017-05-02 RX ADMIN — DIVALPROEX SODIUM 1500 MILLIGRAM(S): 500 TABLET, DELAYED RELEASE ORAL at 20:25

## 2017-05-02 RX ADMIN — ARIPIPRAZOLE 15 MILLIGRAM(S): 15 TABLET ORAL at 09:17

## 2017-05-02 RX ADMIN — Medication 75 MILLIGRAM(S): at 09:02

## 2017-05-02 RX ADMIN — Medication 400 MILLIGRAM(S): at 09:51

## 2017-05-02 RX ADMIN — RISPERIDONE 4 MILLIGRAM(S): 4 TABLET ORAL at 20:25

## 2017-05-03 VITALS — TEMPERATURE: 98 F

## 2017-05-03 PROCEDURE — 99238 HOSP IP/OBS DSCHRG MGMT 30/<: CPT

## 2017-05-03 RX ADMIN — Medication 400 MILLIGRAM(S): at 09:25

## 2017-05-03 RX ADMIN — Medication 75 MILLIGRAM(S): at 08:04

## 2017-05-03 RX ADMIN — ARIPIPRAZOLE 10 MILLIGRAM(S): 15 TABLET ORAL at 08:04

## 2017-05-03 RX ADMIN — Medication 400 MILLIGRAM(S): at 08:57
